# Patient Record
Sex: FEMALE | Race: WHITE | NOT HISPANIC OR LATINO | Employment: STUDENT | URBAN - METROPOLITAN AREA
[De-identification: names, ages, dates, MRNs, and addresses within clinical notes are randomized per-mention and may not be internally consistent; named-entity substitution may affect disease eponyms.]

---
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
## 2021-01-22 ENCOUNTER — TELEPHONE (OUTPATIENT)
Dept: INTERNAL MEDICINE | Facility: CLINIC | Age: 21
End: 2021-01-22

## 2021-02-03 ENCOUNTER — OFFICE VISIT (OUTPATIENT)
Dept: OBSTETRICS AND GYNECOLOGY | Facility: CLINIC | Age: 21
End: 2021-02-03
Payer: OTHER GOVERNMENT

## 2021-02-03 VITALS
DIASTOLIC BLOOD PRESSURE: 60 MMHG | WEIGHT: 157.88 LBS | SYSTOLIC BLOOD PRESSURE: 100 MMHG | BODY MASS INDEX: 23.38 KG/M2 | HEIGHT: 69 IN

## 2021-02-03 DIAGNOSIS — Z30.41 SURVEILLANCE OF PREVIOUSLY PRESCRIBED CONTRACEPTIVE PILL: ICD-10-CM

## 2021-02-03 DIAGNOSIS — Z01.419 ENCOUNTER FOR GYNECOLOGICAL EXAMINATION WITHOUT ABNORMAL FINDING: Primary | ICD-10-CM

## 2021-02-03 PROCEDURE — 99395 PR PREVENTIVE VISIT,EST,18-39: ICD-10-PCS | Mod: S$PBB,,, | Performed by: NURSE PRACTITIONER

## 2021-02-03 PROCEDURE — 99395 PREV VISIT EST AGE 18-39: CPT | Mod: S$PBB,,, | Performed by: NURSE PRACTITIONER

## 2021-02-03 PROCEDURE — 99999 PR PBB SHADOW E&M-EST. PATIENT-LVL III: CPT | Mod: PBBFAC,,, | Performed by: NURSE PRACTITIONER

## 2021-02-03 PROCEDURE — 99213 OFFICE O/P EST LOW 20 MIN: CPT | Mod: PBBFAC,PO | Performed by: NURSE PRACTITIONER

## 2021-02-03 PROCEDURE — 99999 PR PBB SHADOW E&M-EST. PATIENT-LVL III: ICD-10-PCS | Mod: PBBFAC,,, | Performed by: NURSE PRACTITIONER

## 2021-02-03 RX ORDER — NORETHINDRONE ACETATE AND ETHINYL ESTRADIOL 1.5-30(21)
1 KIT ORAL DAILY
Qty: 30 TABLET | Refills: 11 | Status: SHIPPED | OUTPATIENT
Start: 2021-02-03 | End: 2023-05-16

## 2021-04-28 ENCOUNTER — PATIENT MESSAGE (OUTPATIENT)
Dept: RESEARCH | Facility: HOSPITAL | Age: 21
End: 2021-04-28

## 2021-09-20 ENCOUNTER — OFFICE VISIT (OUTPATIENT)
Dept: INTERNAL MEDICINE | Facility: CLINIC | Age: 21
End: 2021-09-20
Payer: OTHER GOVERNMENT

## 2021-09-20 DIAGNOSIS — F41.1 GAD (GENERALIZED ANXIETY DISORDER): Primary | ICD-10-CM

## 2021-09-20 PROCEDURE — 99214 PR OFFICE/OUTPT VISIT, EST, LEVL IV, 30-39 MIN: ICD-10-PCS | Mod: 95,,, | Performed by: NURSE PRACTITIONER

## 2021-09-20 PROCEDURE — 99214 OFFICE O/P EST MOD 30 MIN: CPT | Mod: 95,,, | Performed by: NURSE PRACTITIONER

## 2021-09-20 RX ORDER — ESCITALOPRAM OXALATE 10 MG/1
10 TABLET ORAL DAILY
Qty: 30 TABLET | Refills: 1 | Status: SHIPPED | OUTPATIENT
Start: 2021-09-20 | End: 2021-11-16 | Stop reason: SDUPTHER

## 2021-09-21 ENCOUNTER — TELEPHONE (OUTPATIENT)
Dept: INTERNAL MEDICINE | Facility: CLINIC | Age: 21
End: 2021-09-21

## 2021-11-16 ENCOUNTER — TELEPHONE (OUTPATIENT)
Dept: INTERNAL MEDICINE | Facility: CLINIC | Age: 21
End: 2021-11-16
Payer: OTHER GOVERNMENT

## 2021-11-16 ENCOUNTER — OFFICE VISIT (OUTPATIENT)
Dept: INTERNAL MEDICINE | Facility: CLINIC | Age: 21
End: 2021-11-16
Payer: OTHER GOVERNMENT

## 2021-11-16 DIAGNOSIS — F41.1 GAD (GENERALIZED ANXIETY DISORDER): ICD-10-CM

## 2021-11-16 PROCEDURE — 99213 OFFICE O/P EST LOW 20 MIN: CPT | Mod: 95,,, | Performed by: NURSE PRACTITIONER

## 2021-11-16 PROCEDURE — 99213 PR OFFICE/OUTPT VISIT, EST, LEVL III, 20-29 MIN: ICD-10-PCS | Mod: 95,,, | Performed by: NURSE PRACTITIONER

## 2021-11-16 RX ORDER — ESCITALOPRAM OXALATE 10 MG/1
10 TABLET ORAL DAILY
Qty: 90 TABLET | Refills: 3 | Status: SHIPPED | OUTPATIENT
Start: 2021-11-16 | End: 2022-03-03 | Stop reason: SDUPTHER

## 2022-02-02 ENCOUNTER — TELEPHONE (OUTPATIENT)
Dept: INTERNAL MEDICINE | Facility: CLINIC | Age: 22
End: 2022-02-02
Payer: OTHER GOVERNMENT

## 2022-02-02 NOTE — TELEPHONE ENCOUNTER
----- Message from Filomena Leyva sent at 2/2/2022  2:41 PM CST -----  Type:  Patient Returning Call    Who Called:patient  Who Left Message for Patient:nurse  Does the patient know what this is regarding?:na  Would the patient rather a call back or a response via [facility]? Call back  Best Call Back [phone]  Additional Information: na

## 2022-02-15 ENCOUNTER — TELEPHONE (OUTPATIENT)
Dept: PSYCHIATRY | Facility: CLINIC | Age: 22
End: 2022-02-15
Payer: OTHER GOVERNMENT

## 2022-02-15 NOTE — TELEPHONE ENCOUNTER
Pt was scheduled for an appointment----- Message from Yvrose Reveles sent at 2/15/2022 12:07 PM CST -----  Regarding: referral  Contact: pt  Please call pt at [phone]. Pt has left several messages

## 2022-03-01 ENCOUNTER — PATIENT MESSAGE (OUTPATIENT)
Dept: PSYCHIATRY | Facility: CLINIC | Age: 22
End: 2022-03-01

## 2022-03-01 ENCOUNTER — OFFICE VISIT (OUTPATIENT)
Dept: PSYCHIATRY | Facility: CLINIC | Age: 22
End: 2022-03-01
Payer: OTHER GOVERNMENT

## 2022-03-01 DIAGNOSIS — F43.21 GRIEF ASSOCIATED WITH LOSS OF FETUS: Primary | ICD-10-CM

## 2022-03-01 PROCEDURE — 90791 PSYCH DIAGNOSTIC EVALUATION: CPT | Mod: 95,,, | Performed by: SOCIAL WORKER

## 2022-03-01 PROCEDURE — 90791 PR PSYCHIATRIC DIAGNOSTIC EVALUATION: ICD-10-PCS | Mod: 95,,, | Performed by: SOCIAL WORKER

## 2022-03-01 NOTE — PROGRESS NOTES
Psychiatry Initial Visit (PhD/LCSW)  Diagnostic Interview - CPT 14127    Date: 3/1/2022    Site: Telemed    Referral source: Self    Clinical status of patient: Outpatient    Raegan Osman, a 21 y.o. female, for initial evaluation visit.  Met with patient.    Chief complaint/reason for encounter: grief    History of present illness: Pt reports that she had mild post partum depression and anxiety following delivery of her first child about 2 years ago.  She has stayed on the medication that as initiated at that time and it has worked well in helping her manage her anxiety and depression.  A few weeks ago patient had a miscarriage at 7 weeks of pregnancy and she has been very sad and is grieving the loss of her baby.  Therapist validated her loss and assured her that her response is normal.  I also encouraged her to attend the infant loss support group that is hosted by [facility] over [employer].  That information was sent to her through the portal.      Pain: 0    Symptoms:   · Mood: denied  · Anxiety: denied  · Substance abuse: denied  · Cognitive functioning: denied  · Health behaviors: noncontributory    Psychiatric history: psychotropic management by PCP    Medical history: none    Family history of psychiatric illness: none    Social history (marriage, employment, etc.): Pt and her brother were raised by their parents until they .  Her Dad had visitation until patient was about 11.  She and brother stopped wanting to visit dad on his weekends because he was never there and step mom was not particularly welcoming.  Eventually her biological father surrendered his paternal rights. Pt recalls mom working several jobs and them getting food from the food pantry as a child.  Her mom has a lot of anger towards her biological father.  Mom remarried when patient was about 11 and step Dad has been good to patient and her brother.  Parents also have a child together who is 10 years old.  Patient is  engaged to her child's father.  They have been together on and off for 6 years but most recently have been together for 3 years.  Pt is going to school in [place] and wants to be an VLAD therapist for autistic children.  Her mom and step dad live in [place] and she lives in [place] with boyfriend.  His grandmother gave them a house to live in on her property which has helped them a great deal financially.  Pt recently reached out to biological father because she wanted to know why he was so willing to abandon them. Biological father told a very different story from mom and event sent documents to her suggesting that he had given mom the house, a car and half of his 401K.  Pt is not sure what the truth is because she remember how poor they were and mom tells a different story.  We talked about the possibility that perhaps that was what mom asked for but potentially he did not agree or did not do it.  Mom very defensive and angry that patient reached out to him.  Pt still having contact with him but is not sharing that information with her mom because she gets so upset about it.  Pt able to find neena in her son and trying to find some meaning in her loss.  Step dad is retired  and patient still on his health insurance.        Substance use:   Alcohol: none   Drugs: none   Tobacco: none   Caffeine: none    Current medications and drug reactions (include OTC, herbal): see medication list lexapro    Strengths and liabilities: Strength: Patient accepts guidance/feedback, Strength: Patient is expressive/articulate., Strength: Patient is intelligent., Strength: Patient is motivated for change., Strength: Patient is physically healthy., Strength: Patient has positive support network., Strength: Patient has reasonable judgment., Strength: Patient is stable., Liability: Patient lacks coping skills.    Current Evaluation:     Mental Status Exam:  General Appearance:  unremarkable, age appropriate   Speech: normal tone,  "normal rate, normal pitch, normal volume      Level of Cooperation: cooperative      Thought Processes: normal and logical   Mood: steady      Thought Content: normal, no suicidality, no homicidality, delusions, or paranoia   Affect: congruent and appropriate   Orientation: Oriented x3   Memory: recent >  intact   Attention Span & Concentration: spelled "WORLD" forwards and backwards   Fund of General Knowledge: intact and appropriate to age and level of education, 3 of 4 recent presidents   Abstract Reasoning: interpretation of proverbs was "don't know"   Judgment & Insight: good     Language  intact     Diagnostic Impression - Plan:       ICD-10-CM ICD-9-CM   1. Grief associated with loss of fetus  F43.21 309.0       Plan:individual psychotherapy    Return to Clinic: 2 weeks    Length of Service (minutes): 45    Aure Clarke   03/01/2022   1:07 PM   "

## 2022-03-03 DIAGNOSIS — F41.1 GAD (GENERALIZED ANXIETY DISORDER): ICD-10-CM

## 2022-03-03 RX ORDER — ESCITALOPRAM OXALATE 10 MG/1
10 TABLET ORAL DAILY
Qty: 30 TABLET | Refills: 0 | Status: SHIPPED | OUTPATIENT
Start: 2022-03-03 | End: 2023-05-16

## 2022-03-03 RX ORDER — ESCITALOPRAM OXALATE 10 MG/1
10 TABLET ORAL DAILY
Qty: 90 TABLET | Refills: 3 | OUTPATIENT
Start: 2022-03-03 | End: 2023-03-03

## 2022-03-03 NOTE — TELEPHONE ENCOUNTER
----- Message from Paola Acosta sent at 3/3/2022  1:06 PM CST -----  Contact: Raegan paulette [phone]  Requesting an RX refill or new RX.  Is this a refill or new RX: refill  RX name and strength:  EScitalopram oxalate (LEXAPRO) 10 MG tablet  Is this a 30 day or 90 day RX:   Pharmacy name and phone # :   [facility] - [place] [address]   Phone: [phone]  The doctors have asked that we provide their patients with the following 2 reminders -- prescription refills can take up to 72 hours, and a friendly reminder that in the future you can use your [facility] account to request refills:   Comments: [facility] no longer accepts pt's insurance so her rx's need to go to the pharmacy above

## 2022-03-08 ENCOUNTER — PATIENT MESSAGE (OUTPATIENT)
Dept: PSYCHIATRY | Facility: CLINIC | Age: 22
End: 2022-03-08

## 2022-05-02 ENCOUNTER — PATIENT MESSAGE (OUTPATIENT)
Dept: ADMINISTRATIVE | Facility: HOSPITAL | Age: 22
End: 2022-05-02
Payer: OTHER GOVERNMENT

## 2022-07-15 ENCOUNTER — PATIENT OUTREACH (OUTPATIENT)
Dept: ADMINISTRATIVE | Facility: HOSPITAL | Age: 22
End: 2022-07-15
Payer: OTHER GOVERNMENT

## 2022-07-15 NOTE — PROGRESS NOTES
Working Cervical Cancer Report; chart searched, lab [facility] & [facility]; no records found. I called pt to schedule overdue pap smear.LVM.

## 2022-08-04 ENCOUNTER — PATIENT MESSAGE (OUTPATIENT)
Dept: ADMINISTRATIVE | Facility: HOSPITAL | Age: 22
End: 2022-08-04
Payer: OTHER GOVERNMENT

## 2023-04-21 ENCOUNTER — TELEPHONE (OUTPATIENT)
Dept: OBSTETRICS AND GYNECOLOGY | Facility: CLINIC | Age: 23
End: 2023-04-21
Payer: MEDICAID

## 2023-04-21 NOTE — TELEPHONE ENCOUNTER
----- Message from Ksenia Evans sent at 4/21/2023  3:42 PM CDT -----  Contact: panfilo  .Type:  Patient Returning Call    Who Called: Raegan   Who Left Message for Patient: Nurse   Does the patient know what this is regarding?: Scheduling BC APPT  Would the patient rather a call back or a response via [facility]? CALL   Best Call Back Number: [phone]   Additional Information:

## 2023-04-21 NOTE — TELEPHONE ENCOUNTER
----- Message from Freya Stephens sent at 4/21/2023  1:41 PM CDT -----  Contact: Raegan Raygoza is calling to speak with the nurse regarding scheduling appointment for Birth Control (IUD). Please give a call back at .[phone] as requested.  Thanks  LR

## 2023-04-24 ENCOUNTER — TELEPHONE (OUTPATIENT)
Dept: OBSTETRICS AND GYNECOLOGY | Facility: CLINIC | Age: 23
End: 2023-04-24
Payer: MEDICAID

## 2023-04-24 NOTE — TELEPHONE ENCOUNTER
Left message for patient to return call to [phone].     Detail Level: Zone Patient Specific Otc Recommendations (Will Not Stick From Patient To Patient): DerMend twice a day

## 2023-04-24 NOTE — TELEPHONE ENCOUNTER
----- Message from Dara York sent at 4/24/2023  2:31 PM CDT -----  Contact: Raegan  Type:  Patient Returning Call    Who Called:Raegan  Who Left Message for Patient:unknown  Does the patient know what this is regarding?:Schedule appointment  Would the patient rather a call back or a response via [facility]? call  Best Call Back Number:[phone]  Additional Information: Patient reports missing a call and request another call back.  Thank you,  GH

## 2023-04-24 NOTE — TELEPHONE ENCOUNTER
----- Message from Tiffany Davalos sent at 4/21/2023  4:33 PM CDT -----  Contact: Raegan  .Type:  Patient Returning Call    Who Called:Raegan   Who Left Message for Patient: Mariaa   Does the patient know what this is regarding?:appt   Would the patient rather a call back or a response via [facility]? Call   Best Call Back Number:.[phone]  Additional Information: Pt returning a call

## 2023-05-16 ENCOUNTER — OFFICE VISIT (OUTPATIENT)
Dept: OBSTETRICS AND GYNECOLOGY | Facility: CLINIC | Age: 23
End: 2023-05-16
Payer: MEDICAID

## 2023-05-16 VITALS
HEIGHT: 69 IN | DIASTOLIC BLOOD PRESSURE: 70 MMHG | WEIGHT: 173.75 LBS | BODY MASS INDEX: 25.73 KG/M2 | SYSTOLIC BLOOD PRESSURE: 102 MMHG

## 2023-05-16 DIAGNOSIS — Z30.014 ENCOUNTER FOR INITIAL PRESCRIPTION OF INTRAUTERINE CONTRACEPTIVE DEVICE: Primary | ICD-10-CM

## 2023-05-16 PROCEDURE — 3008F BODY MASS INDEX DOCD: CPT | Mod: CPTII,,, | Performed by: NURSE PRACTITIONER

## 2023-05-16 PROCEDURE — 99212 OFFICE O/P EST SF 10 MIN: CPT | Mod: S$PBB,,, | Performed by: NURSE PRACTITIONER

## 2023-05-16 PROCEDURE — 3078F DIAST BP <80 MM HG: CPT | Mod: CPTII,,, | Performed by: NURSE PRACTITIONER

## 2023-05-16 PROCEDURE — 99999 PR PBB SHADOW E&M-EST. PATIENT-LVL III: ICD-10-PCS | Mod: PBBFAC,,, | Performed by: NURSE PRACTITIONER

## 2023-05-16 PROCEDURE — 1159F PR MEDICATION LIST DOCUMENTED IN MEDICAL RECORD: ICD-10-PCS | Mod: CPTII,,, | Performed by: NURSE PRACTITIONER

## 2023-05-16 PROCEDURE — 3078F PR MOST RECENT DIASTOLIC BLOOD PRESSURE < 80 MM HG: ICD-10-PCS | Mod: CPTII,,, | Performed by: NURSE PRACTITIONER

## 2023-05-16 PROCEDURE — 3074F PR MOST RECENT SYSTOLIC BLOOD PRESSURE < 130 MM HG: ICD-10-PCS | Mod: CPTII,,, | Performed by: NURSE PRACTITIONER

## 2023-05-16 PROCEDURE — 1160F PR REVIEW ALL MEDS BY PRESCRIBER/CLIN PHARMACIST DOCUMENTED: ICD-10-PCS | Mod: CPTII,,, | Performed by: NURSE PRACTITIONER

## 2023-05-16 PROCEDURE — 99999 PR PBB SHADOW E&M-EST. PATIENT-LVL III: CPT | Mod: PBBFAC,,, | Performed by: NURSE PRACTITIONER

## 2023-05-16 PROCEDURE — 3074F SYST BP LT 130 MM HG: CPT | Mod: CPTII,,, | Performed by: NURSE PRACTITIONER

## 2023-05-16 PROCEDURE — 99213 OFFICE O/P EST LOW 20 MIN: CPT | Mod: PBBFAC,PO | Performed by: NURSE PRACTITIONER

## 2023-05-16 PROCEDURE — 1160F RVW MEDS BY RX/DR IN RCRD: CPT | Mod: CPTII,,, | Performed by: NURSE PRACTITIONER

## 2023-05-16 PROCEDURE — 1159F MED LIST DOCD IN RCRD: CPT | Mod: CPTII,,, | Performed by: NURSE PRACTITIONER

## 2023-05-16 PROCEDURE — 99212 PR OFFICE/OUTPT VISIT, EST, LEVL II, 10-19 MIN: ICD-10-PCS | Mod: S$PBB,,, | Performed by: NURSE PRACTITIONER

## 2023-05-16 PROCEDURE — 3008F PR BODY MASS INDEX (BMI) DOCUMENTED: ICD-10-PCS | Mod: CPTII,,, | Performed by: NURSE PRACTITIONER

## 2023-05-16 RX ORDER — ASCORBIC ACID, CHOLECALCIFEROL, DL-.ALPHA.-TOCOPHEROL ACETATE, THIAMINE HYDROCHLORIDE, RIBOFLAVIN, NIACINAMIDE, PYRIDOXINE HYDROCHLORIDE, FOLIC ACID, CYANOCOBALAMIN, CALCIUM CARBONATE, FERROUS FUMARATE, ZINC OXIDE, CUPRIC SULFATE 100; 400; 30; 1.6; 1.6; 20; 3.1; 1; 12; 200; 27; 10; 2 MG/1; [IU]/1; [IU]/1; MG/1; MG/1; MG/1; MG/1; MG/1; UG/1; MG/1; MG/1; MG/1; MG/1
1 TABLET, COATED ORAL
COMMUNITY
Start: 2023-05-08 | End: 2023-06-21 | Stop reason: SDUPTHER

## 2023-05-16 RX ORDER — ACETAMINOPHEN AND CODEINE PHOSPHATE 120; 12 MG/5ML; MG/5ML
1 SOLUTION ORAL
COMMUNITY
Start: 2023-04-20 | End: 2023-05-19 | Stop reason: ALTCHOICE

## 2023-05-16 NOTE — PROGRESS NOTES
Subjective:       Patient ID: Raegan Osman is a 23 y.o. female.    Chief Complaint:  Well Woman      History of Present Illness  HPI   present for birth control  Recently moved back here from [place]  Started minipill  r/t breastfeeding 4 month old  Would like to discuss other options  Interested in LARC  Currently menstruating; declines annual exam today    [facility]    GYN & OB History  Patient's last menstrual period was 2023.   Date of Last Pap: No result found    OB History    Para Term  AB Living   3 2 2 0 1 2   SAB IAB Ectopic Multiple Live Births   1 0 0   2      # Outcome Date GA Lbr Colin/2nd Weight Sex Delivery Anes PTL Lv   3 Term 23 39w0d   M Vag-Spont   JUNAID   2 SAB 2022           1 Term 02/10/20 40w1d / 01:06 3.66 kg (8 lb 1.1 oz) M Vag-Spont EPI N JUNAID      Complications: Fever of unknown origin (FUO), Hemorrhage after delivery of fetus       Review of Systems  Review of Systems   Genitourinary:  Negative for menstrual problem.         Objective:      Physical Exam:   Constitutional: She is oriented to person, place, and time. She appears well-developed and well-nourished. No distress.    HENT:   Head: Normocephalic and atraumatic.    Eyes: Pupils are equal, round, and reactive to light. Conjunctivae and EOM are normal.      Pulmonary/Chest: Effort normal.                  Musculoskeletal: Normal range of motion and moves all extremeties.       Neurological: She is alert and oriented to person, place, and time.    Skin: Skin is warm and dry. No rash noted. She is not diaphoretic. No erythema. No pallor.    Psychiatric: She has a normal mood and affect. Her behavior is normal. Judgment and thought content normal.           Assessment:        1. Encounter for initial prescription of intrauterine contraceptive device    2. Encounter for gynecological examination without abnormal finding               Plan:   Risks and benefits of LARCS discussed  Pt chose  radha  Will insert Friday  Safe sex practices discussed.  Instructed to take NSAID with food when on the way to visit.    Continue annual well woman exam.    Encounter for initial prescription of intrauterine contraceptive device  -     Device Authorization Order    Encounter for gynecological examination without abnormal finding  -     Cancel: Liquid-Based Pap Smear, Screening

## 2023-05-19 ENCOUNTER — PROCEDURE VISIT (OUTPATIENT)
Dept: OBSTETRICS AND GYNECOLOGY | Facility: CLINIC | Age: 23
End: 2023-05-19
Payer: MEDICAID

## 2023-05-19 VITALS
DIASTOLIC BLOOD PRESSURE: 82 MMHG | BODY MASS INDEX: 25.86 KG/M2 | WEIGHT: 174.63 LBS | SYSTOLIC BLOOD PRESSURE: 110 MMHG | HEIGHT: 69 IN

## 2023-05-19 DIAGNOSIS — Z30.430 ENCOUNTER FOR IUD INSERTION: Primary | ICD-10-CM

## 2023-05-19 LAB
B-HCG UR QL: NEGATIVE
CTP QC/QA: YES

## 2023-05-19 PROCEDURE — 58300 INSERT INTRAUTERINE DEVICE: CPT | Mod: PBBFAC,PO | Performed by: NURSE PRACTITIONER

## 2023-05-19 PROCEDURE — 81025 URINE PREGNANCY TEST: CPT | Mod: PBBFAC,PO | Performed by: NURSE PRACTITIONER

## 2023-05-19 PROCEDURE — 58300 INSERTION OF IUD: ICD-10-PCS | Mod: S$PBB,,, | Performed by: NURSE PRACTITIONER

## 2023-05-19 PROCEDURE — 58300 INSERT INTRAUTERINE DEVICE: CPT | Mod: S$PBB,,, | Performed by: NURSE PRACTITIONER

## 2023-05-19 RX ADMIN — LEVONORGESTREL 17.5 MCG: 19.5 INTRAUTERINE DEVICE INTRAUTERINE at 02:05

## 2023-05-19 NOTE — PROCEDURES
Insertion of IUD    Date/Time: 5/19/2023 2:00 PM  Performed by: Hillary Yeboah NP  Authorized by: Hillary Yeboah NP     Consent:     Consent obtained:  Written    Consent given by:  Patient    Procedure risks and benefits discussed: yes      Patient questions answered: yes      Patient agrees, verbalizes understanding, and wants to proceed: yes      Educational handouts given: yes      Instructions and paperwork completed: yes    Procedure:     Pelvic exam performed: yes      Negative GC/chlamydia test: no      Negative urine pregnancy test: yes      Negative serum pregnancy test: no      Cervix cleaned and prepped: yes      Speculum placed in vagina: yes      Tenaculum applied to cervix: no      Uterus sounded: yes      Uterus sound depth (cm):  6.5    IUD inserted with no complications: yes      Strings trimmed: yes (1 in)    17.5 mcg levonorgestreL 17.5 mcg/24 hrs (5 yrs) 19.5 mg     Post-procedure:     Patient tolerated procedure well: yes      Patient will follow up after next period: yes

## 2023-06-02 ENCOUNTER — TELEPHONE (OUTPATIENT)
Dept: OBSTETRICS AND GYNECOLOGY | Facility: CLINIC | Age: 23
End: 2023-06-02
Payer: MEDICAID

## 2023-06-02 NOTE — TELEPHONE ENCOUNTER
Attempted to contact pt to inform that appt 6/16 with LW needed to be r/s due to a provider schedule change. Pt did not answer. LVM to rtc. Appt cancelled. Message to pt

## 2023-06-02 NOTE — TELEPHONE ENCOUNTER
Patient r/s appt on 6/16 with LW. Pt stated that she was not able to get pap smear records so she may need a pap. Pt states that she has been bleeding on and off since IUD insertion. Pt verbalized understanding and agreed to date, time, and location of appt.

## 2023-06-20 ENCOUNTER — OFFICE VISIT (OUTPATIENT)
Dept: OBSTETRICS AND GYNECOLOGY | Facility: CLINIC | Age: 23
End: 2023-06-20
Payer: MEDICAID

## 2023-06-20 VITALS
HEIGHT: 69 IN | WEIGHT: 166.88 LBS | SYSTOLIC BLOOD PRESSURE: 114 MMHG | BODY MASS INDEX: 24.72 KG/M2 | DIASTOLIC BLOOD PRESSURE: 54 MMHG

## 2023-06-20 DIAGNOSIS — Z30.431 ENCOUNTER FOR ROUTINE CHECKING OF INTRAUTERINE CONTRACEPTIVE DEVICE (IUD): Primary | ICD-10-CM

## 2023-06-20 PROCEDURE — 99999 PR PBB SHADOW E&M-EST. PATIENT-LVL III: ICD-10-PCS | Mod: PBBFAC,,, | Performed by: NURSE PRACTITIONER

## 2023-06-20 PROCEDURE — 3074F PR MOST RECENT SYSTOLIC BLOOD PRESSURE < 130 MM HG: ICD-10-PCS | Mod: CPTII,,, | Performed by: NURSE PRACTITIONER

## 2023-06-20 PROCEDURE — 99213 OFFICE O/P EST LOW 20 MIN: CPT | Mod: PBBFAC,PO | Performed by: NURSE PRACTITIONER

## 2023-06-20 PROCEDURE — 1160F RVW MEDS BY RX/DR IN RCRD: CPT | Mod: CPTII,,, | Performed by: NURSE PRACTITIONER

## 2023-06-20 PROCEDURE — 1159F MED LIST DOCD IN RCRD: CPT | Mod: CPTII,,, | Performed by: NURSE PRACTITIONER

## 2023-06-20 PROCEDURE — 1159F PR MEDICATION LIST DOCUMENTED IN MEDICAL RECORD: ICD-10-PCS | Mod: CPTII,,, | Performed by: NURSE PRACTITIONER

## 2023-06-20 PROCEDURE — 3074F SYST BP LT 130 MM HG: CPT | Mod: CPTII,,, | Performed by: NURSE PRACTITIONER

## 2023-06-20 PROCEDURE — 1160F PR REVIEW ALL MEDS BY PRESCRIBER/CLIN PHARMACIST DOCUMENTED: ICD-10-PCS | Mod: CPTII,,, | Performed by: NURSE PRACTITIONER

## 2023-06-20 PROCEDURE — 3078F PR MOST RECENT DIASTOLIC BLOOD PRESSURE < 80 MM HG: ICD-10-PCS | Mod: CPTII,,, | Performed by: NURSE PRACTITIONER

## 2023-06-20 PROCEDURE — 99213 OFFICE O/P EST LOW 20 MIN: CPT | Mod: S$PBB,,, | Performed by: NURSE PRACTITIONER

## 2023-06-20 PROCEDURE — 3078F DIAST BP <80 MM HG: CPT | Mod: CPTII,,, | Performed by: NURSE PRACTITIONER

## 2023-06-20 PROCEDURE — 3008F PR BODY MASS INDEX (BMI) DOCUMENTED: ICD-10-PCS | Mod: CPTII,,, | Performed by: NURSE PRACTITIONER

## 2023-06-20 PROCEDURE — 99213 PR OFFICE/OUTPT VISIT, EST, LEVL III, 20-29 MIN: ICD-10-PCS | Mod: S$PBB,,, | Performed by: NURSE PRACTITIONER

## 2023-06-20 PROCEDURE — 3008F BODY MASS INDEX DOCD: CPT | Mod: CPTII,,, | Performed by: NURSE PRACTITIONER

## 2023-06-20 PROCEDURE — 99999 PR PBB SHADOW E&M-EST. PATIENT-LVL III: CPT | Mod: PBBFAC,,, | Performed by: NURSE PRACTITIONER

## 2023-06-20 NOTE — PROGRESS NOTES
Subjective:       Patient ID: Raegan Osman is a 23 y.o. female.    Chief Complaint:  IUD Check      History of Present Illness  HPI   present for kyleena string check  Has been bleeding since insertion  Has not checked for strings  Intermittent cramping; tolerable; has not taken anything  Flow varies; steady today; never light  No flooding  Pads-size 2 changing q2-3h for cleanliness  No issues with intercourse    Needs refill on PNV    GYN & OB History  No LMP recorded. (Menstrual status: Birth Control).   Date of Last Pap: No result found    OB History    Para Term  AB Living   3 2 2 0 1 2   SAB IAB Ectopic Multiple Live Births   1 0 0   2      # Outcome Date GA Lbr Colin/2nd Weight Sex Delivery Anes PTL Lv   3 Term 23 39w0d   M Vag-Spont   JUNAID   2 SAB 2022           1 Term 02/10/20 40w1d / 01:06 3.66 kg (8 lb 1.1 oz) M Vag-Spont EPI N JUNAID      Complications: Fever of unknown origin (FUO), Hemorrhage after delivery of fetus       Review of Systems  Review of Systems   Genitourinary:  Positive for menstrual problem. Negative for dysmenorrhea, dyspareunia, menorrhagia, pelvic pain, vaginal discharge and vaginal odor.         Objective:      Physical Exam:   Constitutional: She is oriented to person, place, and time. She appears well-developed and well-nourished. No distress.    HENT:   Head: Normocephalic and atraumatic.    Eyes: Pupils are equal, round, and reactive to light. Conjunctivae and EOM are normal.     Cardiovascular:  Normal rate.             Pulmonary/Chest: Effort normal.        Abdominal: Soft. She exhibits no distension. There is no abdominal tenderness. There is no rebound and no guarding. Hernia confirmed negative in the right inguinal area and confirmed negative in the left inguinal area.     Genitourinary:    Inguinal canal, uterus, right adnexa and left adnexa normal.   Rectum:      No external hemorrhoid.      Pelvic exam was performed with patient supine.    The external female genitalia was normal.   No external genitalia lesions identified,Genitalia hair distrobution normal .   Labial bartholins normal.There is no rash, tenderness, lesion or injury on the right labia. There is no rash, tenderness, lesion or injury on the left labia. Cervix is normal. Right adnexum displays no mass, no tenderness and no fullness. Left adnexum displays no mass, no tenderness and no fullness. There is bleeding (small amt) and rectocele in the vagina. No erythema,  no vaginal discharge or tenderness in the vagina.    No foreign body in the vagina.      No signs of injury in the vagina.   Cervix exhibits no motion tenderness, no lesion, no friability, no lesion, no tenderness and no polyp. Uerus contour normal  Uterus is not enlarged and not tender. Uterus size: 8 cm.Normal urethral meatus.Urethral Meatus exhibits: urethral lesion and prolapsedUrethra findings: no urethral mass, no tenderness and no urethral scarringBladder findings: no bladder distention and no bladder tendernessIUD strings visualized.          Musculoskeletal: Normal range of motion and moves all extremeties.      Lymphadenopathy: No inguinal adenopathy noted on the right or left side.    Neurological: She is alert and oriented to person, place, and time.    Skin: Skin is warm and dry. No rash noted. She is not diaphoretic. No erythema. No pallor.    Psychiatric: She has a normal mood and affect. Her behavior is normal. Judgment and thought content normal.           Assessment:        1. Encounter for routine checking of intrauterine contraceptive device (IUD)    2. Lactating mother               Plan:   Continue strings checks after menses ends  NSAIDs as needed for pelvic pain; if not resolved RTC  May still experience irregular bleeding for 3-6 months after insertion    U/s ordered  Refill sent on PNV    Continue annual well woman exam.    Encounter for routine checking of intrauterine contraceptive device (IUD)  -      US Pelvis Comp with Transvag NON-OB (xpd; Future; Expected date: 06/20/2023    Lactating mother  -      27 mg iron- 1 mg Tab; Take 1 tablet by mouth once daily.  Dispense: 30 tablet; Refill: 12

## 2023-06-21 ENCOUNTER — PATIENT MESSAGE (OUTPATIENT)
Dept: OBSTETRICS AND GYNECOLOGY | Facility: CLINIC | Age: 23
End: 2023-06-21
Payer: MEDICAID

## 2023-06-21 DIAGNOSIS — N81.6 RECTOCELE: Primary | ICD-10-CM

## 2023-06-21 RX ORDER — ASCORBIC ACID, CHOLECALCIFEROL, DL-.ALPHA.-TOCOPHEROL ACETATE, THIAMINE HYDROCHLORIDE, RIBOFLAVIN, NIACINAMIDE, PYRIDOXINE HYDROCHLORIDE, FOLIC ACID, CYANOCOBALAMIN, CALCIUM CARBONATE, FERROUS FUMARATE, ZINC OXIDE, CUPRIC SULFATE 100; 400; 30; 1.6; 1.6; 20; 3.1; 1; 12; 200; 27; 10; 2 MG/1; [IU]/1; [IU]/1; MG/1; MG/1; MG/1; MG/1; MG/1; UG/1; MG/1; MG/1; MG/1; MG/1
1 TABLET, COATED ORAL DAILY
Qty: 30 TABLET | Refills: 12 | Status: SHIPPED | OUTPATIENT
Start: 2023-06-21

## 2023-06-23 ENCOUNTER — PATIENT MESSAGE (OUTPATIENT)
Dept: OBSTETRICS AND GYNECOLOGY | Facility: CLINIC | Age: 23
End: 2023-06-23
Payer: MEDICAID

## 2023-06-29 ENCOUNTER — HOSPITAL ENCOUNTER (OUTPATIENT)
Dept: RADIOLOGY | Facility: HOSPITAL | Age: 23
Discharge: HOME | End: 2023-06-29
Attending: NURSE PRACTITIONER
Payer: MEDICAID

## 2023-06-29 ENCOUNTER — PATIENT MESSAGE (OUTPATIENT)
Dept: OBSTETRICS AND GYNECOLOGY | Facility: CLINIC | Age: 23
End: 2023-06-29
Payer: MEDICAID

## 2023-06-29 DIAGNOSIS — Z30.431 ENCOUNTER FOR ROUTINE CHECKING OF INTRAUTERINE CONTRACEPTIVE DEVICE (IUD): ICD-10-CM

## 2023-06-29 PROCEDURE — 76856 US PELVIS COMP WITH TRANSVAG NON-OB (XPD): ICD-10-PCS | Mod: 26,,, | Performed by: RADIOLOGY

## 2023-06-29 PROCEDURE — 76856 US EXAM PELVIC COMPLETE: CPT | Mod: TC,PO

## 2023-06-29 PROCEDURE — 76830 US PELVIS COMP WITH TRANSVAG NON-OB (XPD): ICD-10-PCS | Mod: 26,,, | Performed by: RADIOLOGY

## 2023-06-29 PROCEDURE — 76830 TRANSVAGINAL US NON-OB: CPT | Mod: 26,,, | Performed by: RADIOLOGY

## 2023-06-29 PROCEDURE — 76856 US EXAM PELVIC COMPLETE: CPT | Mod: 26,,, | Performed by: RADIOLOGY

## 2023-10-23 ENCOUNTER — TELEPHONE (OUTPATIENT)
Dept: OBSTETRICS AND GYNECOLOGY | Facility: CLINIC | Age: 23
End: 2023-10-23
Payer: MEDICAID

## 2023-10-23 NOTE — TELEPHONE ENCOUNTER
----- Message from Diamone Speed sent at 10/23/2023  1:06 PM CDT -----  Regarding: self  [phone]  Type: Patient Call Back       Who called: self        What is the request in detail: pt have some concerning she doesn't know if she have a uti yeast infection and would dari to be seen as soon as possible         Can the clinic reply by [facility]? Yes       Would the patient rather a call back or a response via [facility]? My chart       Best call back number: [phone]

## 2023-10-27 ENCOUNTER — OFFICE VISIT (OUTPATIENT)
Dept: OBSTETRICS AND GYNECOLOGY | Facility: CLINIC | Age: 23
End: 2023-10-27
Payer: MEDICAID

## 2023-10-27 VITALS
SYSTOLIC BLOOD PRESSURE: 100 MMHG | DIASTOLIC BLOOD PRESSURE: 64 MMHG | HEIGHT: 69 IN | WEIGHT: 151.88 LBS | BODY MASS INDEX: 22.49 KG/M2

## 2023-10-27 DIAGNOSIS — R35.0 URINARY FREQUENCY: Primary | ICD-10-CM

## 2023-10-27 DIAGNOSIS — Z97.5 IUD (INTRAUTERINE DEVICE) IN PLACE: ICD-10-CM

## 2023-10-27 DIAGNOSIS — N76.0 ACUTE VAGINITIS: ICD-10-CM

## 2023-10-27 LAB
BILIRUBIN, UA POC OHS: NEGATIVE
BLOOD, UA POC OHS: NEGATIVE
CLARITY, UA POC OHS: CLEAR
COLOR, UA POC OHS: YELLOW
GLUCOSE, UA POC OHS: NEGATIVE
KETONES, UA POC OHS: NEGATIVE
LEUKOCYTES, UA POC OHS: NEGATIVE
NITRITE, UA POC OHS: NEGATIVE
PH, UA POC OHS: 6
PROTEIN, UA POC OHS: NEGATIVE
SPECIFIC GRAVITY, UA POC OHS: >=1.03
UROBILINOGEN, UA POC OHS: 0.2

## 2023-10-27 PROCEDURE — 99213 OFFICE O/P EST LOW 20 MIN: CPT | Mod: S$PBB,,, | Performed by: NURSE PRACTITIONER

## 2023-10-27 PROCEDURE — 3078F PR MOST RECENT DIASTOLIC BLOOD PRESSURE < 80 MM HG: ICD-10-PCS | Mod: CPTII,,, | Performed by: NURSE PRACTITIONER

## 2023-10-27 PROCEDURE — 99999PBSHW PR PBB SHADOW TECHNICAL ONLY FILED TO HB: Mod: PBBFAC,,,

## 2023-10-27 PROCEDURE — 99999PBSHW POCT URINALYSIS(INSTRUMENT): Mod: PBBFAC,,,

## 2023-10-27 PROCEDURE — 81003 URINALYSIS AUTO W/O SCOPE: CPT | Mod: PBBFAC,PO | Performed by: NURSE PRACTITIONER

## 2023-10-27 PROCEDURE — 1160F RVW MEDS BY RX/DR IN RCRD: CPT | Mod: CPTII,,, | Performed by: NURSE PRACTITIONER

## 2023-10-27 PROCEDURE — 81514 NFCT DS BV&VAGINITIS DNA ALG: CPT | Performed by: NURSE PRACTITIONER

## 2023-10-27 PROCEDURE — 1160F PR REVIEW ALL MEDS BY PRESCRIBER/CLIN PHARMACIST DOCUMENTED: ICD-10-PCS | Mod: CPTII,,, | Performed by: NURSE PRACTITIONER

## 2023-10-27 PROCEDURE — 3008F BODY MASS INDEX DOCD: CPT | Mod: CPTII,,, | Performed by: NURSE PRACTITIONER

## 2023-10-27 PROCEDURE — 1159F PR MEDICATION LIST DOCUMENTED IN MEDICAL RECORD: ICD-10-PCS | Mod: CPTII,,, | Performed by: NURSE PRACTITIONER

## 2023-10-27 PROCEDURE — 99999 PR PBB SHADOW E&M-EST. PATIENT-LVL III: CPT | Mod: PBBFAC,,, | Performed by: NURSE PRACTITIONER

## 2023-10-27 PROCEDURE — 99213 PR OFFICE/OUTPT VISIT, EST, LEVL III, 20-29 MIN: ICD-10-PCS | Mod: S$PBB,,, | Performed by: NURSE PRACTITIONER

## 2023-10-27 PROCEDURE — 1159F MED LIST DOCD IN RCRD: CPT | Mod: CPTII,,, | Performed by: NURSE PRACTITIONER

## 2023-10-27 PROCEDURE — 99213 OFFICE O/P EST LOW 20 MIN: CPT | Mod: PBBFAC,PO | Performed by: NURSE PRACTITIONER

## 2023-10-27 PROCEDURE — 3078F DIAST BP <80 MM HG: CPT | Mod: CPTII,,, | Performed by: NURSE PRACTITIONER

## 2023-10-27 PROCEDURE — 3074F SYST BP LT 130 MM HG: CPT | Mod: CPTII,,, | Performed by: NURSE PRACTITIONER

## 2023-10-27 PROCEDURE — 3074F PR MOST RECENT SYSTOLIC BLOOD PRESSURE < 130 MM HG: ICD-10-PCS | Mod: CPTII,,, | Performed by: NURSE PRACTITIONER

## 2023-10-27 PROCEDURE — 3008F PR BODY MASS INDEX (BMI) DOCUMENTED: ICD-10-PCS | Mod: CPTII,,, | Performed by: NURSE PRACTITIONER

## 2023-10-27 PROCEDURE — 99999 PR PBB SHADOW E&M-EST. PATIENT-LVL III: ICD-10-PCS | Mod: PBBFAC,,, | Performed by: NURSE PRACTITIONER

## 2023-10-27 PROCEDURE — 99999PBSHW POCT URINALYSIS(INSTRUMENT): ICD-10-PCS | Mod: PBBFAC,,,

## 2023-10-27 NOTE — PROGRESS NOTES
Subjective:       Patient ID: Raegan Osman is a 23 y.o. female.    Chief Complaint:  Urinary Tract Infection and Vaginitis      History of Present Illness  HPI   present for vaginal irritation and burning when she wipes since Monday  No discharge  Did experience spotting for the first time since insertion with cramping recently  Urinary frequency; drinking more water  Breastfeeding  Showers with dove, but switched to dial x2 weeks  Has not been able to scheduled with PFT    GYN & OB History  No LMP recorded. (Menstrual status: Birth Control).   Date of Last Pap: No result found    OB History    Para Term  AB Living   3 2 2 0 1 2   SAB IAB Ectopic Multiple Live Births   1 0 0   2      # Outcome Date GA Lbr Colin/2nd Weight Sex Delivery Anes PTL Lv   3 Term 23 39w0d   M Vag-Spont   JUNAID   2 SAB 2022           1 Term 02/10/20 40w1d / 01:06 3.66 kg (8 lb 1.1 oz) M Vag-Spont EPI N JUNAID      Complications: Fever of unknown origin (FUO), Hemorrhage after delivery of fetus       Review of Systems  Review of Systems   Genitourinary:  Positive for frequency, pelvic pain and vaginal pain (burning). Negative for dysuria, menorrhagia, menstrual problem, urgency, vaginal discharge and vaginal odor.           Objective:      Physical Exam:   Constitutional: She is oriented to person, place, and time. She appears well-developed and well-nourished. No distress.    HENT:   Head: Normocephalic and atraumatic.    Eyes: Pupils are equal, round, and reactive to light. Conjunctivae and EOM are normal.     Cardiovascular:  Normal rate.             Pulmonary/Chest: Effort normal.        Abdominal: Soft. She exhibits no distension. There is no abdominal tenderness. There is no rebound and no guarding. Hernia confirmed negative in the right inguinal area and confirmed negative in the left inguinal area.     Genitourinary:    Inguinal canal, uterus, right adnexa and left adnexa normal.   Rectum:      No  external hemorrhoid.      Pelvic exam was performed with patient in the lithotomy position.   The external female genitalia was normal.   No external genitalia lesions identified,Genitalia hair distrobution normal .   Labial bartholins normal.There is no rash, tenderness, lesion or injury on the right labia. There is no rash, tenderness, lesion or injury on the left labia. Cervix is normal. Right adnexum displays no mass, no tenderness and no fullness. Left adnexum displays no mass, no tenderness and no fullness. There is rectocele in the vagina. No erythema,  no vaginal discharge, tenderness or bleeding in the vagina.    No foreign body in the vagina.      No signs of injury in the vagina.   Cervix exhibits no motion tenderness, no lesion, no friability, no lesion, no tenderness and no polyp. Uerus contour normal  Uterus is not enlarged and not tender. Uterus size: 8 cm.Normal urethral meatus.Urethral Meatus exhibits: urethral lesion and prolapsedUrethra findings: no urethral mass, no tenderness and no urethral scarringBladder findings: no bladder distention and no bladder tendernessIUD strings visualized.          Musculoskeletal: Normal range of motion and moves all extremeties.      Lymphadenopathy: No inguinal adenopathy noted on the right or left side.    Neurological: She is alert and oriented to person, place, and time.    Skin: Skin is warm and dry. No rash noted. She is not diaphoretic. No erythema. No pallor.    Psychiatric: She has a normal mood and affect. Her behavior is normal. Judgment and thought content normal.             Assessment:        1. Urinary frequency    2. Acute vaginitis    3. IUD (intrauterine device) in place               Plan:   U/a normal  Affirm collected.    Continue menstrual calendar  Will reach out to PT    Continue annual well woman exam.    Urinary frequency  -     POCT Urinalysis(Instrument)    Acute vaginitis  -     Vaginosis Screen by DNA Probe    IUD (intrauterine  device) in place

## 2023-10-28 LAB
BACTERIAL VAGINOSIS DNA: NEGATIVE
CANDIDA GLABRATA DNA: NEGATIVE
CANDIDA KRUSEI DNA: NEGATIVE
CANDIDA RRNA VAG QL PROBE: NEGATIVE
T VAGINALIS RRNA GENITAL QL PROBE: NEGATIVE

## 2023-11-04 ENCOUNTER — PATIENT MESSAGE (OUTPATIENT)
Dept: OBSTETRICS AND GYNECOLOGY | Facility: CLINIC | Age: 23
End: 2023-11-04
Payer: MEDICAID

## 2023-11-15 ENCOUNTER — CLINICAL SUPPORT (OUTPATIENT)
Dept: REHABILITATION | Facility: HOSPITAL | Age: 23
End: 2023-11-15
Payer: MEDICAID

## 2023-11-15 DIAGNOSIS — N81.89 PELVIC FLOOR WEAKNESS IN FEMALE: ICD-10-CM

## 2023-11-15 DIAGNOSIS — N81.9 FEMALE GENITAL PROLAPSE, UNSPECIFIED TYPE: Primary | ICD-10-CM

## 2023-11-15 PROCEDURE — 97161 PT EVAL LOW COMPLEX 20 MIN: CPT | Mod: PN

## 2023-11-15 PROCEDURE — 97530 THERAPEUTIC ACTIVITIES: CPT | Mod: PN

## 2023-11-15 NOTE — PATIENT INSTRUCTIONS
"Bowel Movement Body Mechanics     1. Sit on the toilet comfortably with legs and buttocks relaxed.  2. Put your feet on a step stool or squatty potty (7-9 inches tall). This helps the poop come out easier.  3. Use good "potty posture": Lean forward while keeping your back straight and rest your elbows or forearms on your knees, keeping the knees apart.  4. The pelvic floor has to fully relax for the poop to come out. Let your whole body relax, even letting the belly hang. Try to think about fully relaxing or "dropping" the pelvic floor muscles. You may want to do a few diaphragmatic breaths to help with this.  5. Exhale like you are blowing out birthday candles while you gently bear down. Do not strain or hold your breath!                Kegels- Supported Posterior Pelvic Tilt    Begin lying on your back with your knees bent and feet flat on the floor, and a firm pillow under your pelvis to help maintain an posteriorly rotated pelvis as shown in the picture. This will help flatten the arch in your back so that your back remains flat on the floor.       Once youre in position, begin with taking deep belly breaths while you bring awareness to your pelvic floor.  Consciously try to get a feel for what it feels like to contract and relax your pelvic floor muscles so you can feel the difference between shortening (pulling in) and lengthening (letting go).     Now picture your pelvic floor as an elevator. When you INHALE deeply contract the pelvic floor muscle and make the elevator go up a couple of floors.  Hold the elevator up for 5 seconds.   Now EXHALE all the air out through your mouth while you relax the pelvic floor muscles SLOWLY letting the "elevator go back down to the ground level" slowly letting that tension go for about 5 seconds.    Once your pelvic floor muscles are relaxed and the elevator is back at ground level rest for one whole breathing cycle of inhalation/exhalation about 10 seconds.      Repeat  " this a total of 10 repetitions of raising and lowering the elevator with 10 second rest in between each repetition.

## 2023-11-15 NOTE — PLAN OF CARE
"[facility]   Physical Therapy Initial Evaluation        Date: 11/15/2023   Name: Raegan Osman  Clinic Number: [id number]    Therapy Diagnosis:   Encounter Diagnoses   Name Primary?    Female genital prolapse, unspecified type Yes    Pelvic floor weakness in female      Physician: Hillary Yeboah, NP    Physician Orders: PT Eval and Treat   Medical Diagnosis from Referral: N81.6 (ICD-10-CM) - Rectocele   Evaluation Date: 11/15/2023  Authorization Period Expiration: 2024  Plan of Care Expiration: 2/15/2024  Progress Note Due: 12/15/2023  Visit # / Visits authorized:    FOTO: 1/3    Precautions: Standard     Time In: 11:07 AM   Time Out: 11:32 AM   Total Appointment Time (timed & untimed codes): 25 minutes    SUBJECTIVE       Date of onset: patient reports her OB said her pelvis is lower than it should be. She denies any pain or incontinence.   History of current condition - Raegan reports: vaginal air coming out ("vaginal fart"). She reports occasional pain with intercourse, but her fiance will sometimes be gone for a month at time.      OB/GYN History:  and vaginal delivery  Using vaginal estrogen cream: No  Sexually active? Yes  Pelvic Pain with: with intercourse occasionally;  works away for a month   Pelvic pressure? No    Bladder/Bowel History: pain with bowel movements in the past   Frequency of urination:   Daytime: can wait 2-4 hours            Nighttime: 0  Difficulty initiating urine stream: No  Urine stream: strong  Complete emptying: Yes  Bladder leakage: No  Urinary Urgency: No.      Frequency of bowel movements: every few days; always been that or every other day   Difficulty initiating BM: No  Quality/Shape of BM: Fergus Stool Chart type 3-4  Does Patient Feel Empty after BM? Yes  Bowel Urgency: No.    Fiber Supplements or Laxative Use? No   Pain with BM: Not currently    Bleeding with BM: Yes used to with straining    Colon leakage: " "No    Types of fluid intake: Water:  ozs/day and Coffee: 0-1/day  Diet: Standard  Habitus: well developed, well nourished  Abuse/Neglect: Pt denies a history of physical or emotional abuse at this visit.       Pain:  Location: none currently; pain with intercourse   Current 0/10, worst 5/10, best 0/10   Pelvic Pain Duration Occurs only during provocation  Location of Pelvic Pain: Deep pelvic floor muscles   Description: Sharp  Aggravating Factors/Activities that cause symptoms: Vaginal exam/provocation   Easing Factors: termination of vaginal exam/intercourse      Medical History: Raegan  has a past medical history of Heart block and Postpartum depression.     Surgical History: Raegan Osman  has a past surgical history that includes Umbilical hernia repair.    Medications: Raegan has a current medication list which includes the following prescription(s): , and the following Facility-Administered Medications: levonorgestrel.    Allergies: Review of patient's allergies indicates:  No Known Allergies       Prior Therapy/Previous treatment included: no   Social History/Living Arrangements: lives with their family  Occupation:    Prior Level of Function: Pt was independent with all ADLs and iADLs without pain, no reports of incontinence of bowel or bladder.  Current Level of Function: Pt is still independent with all ADLs and iADLs without pain, no reports of incontinence of bowel or bladder.    Pts goals: "get rid of vaginal farts" and "get everything to got back to where it should be"    OBJECTIVE     See EMR under MEDIA for written consent provided 11/15/2023  Chaperone: declined    ORTHO SCREEN  Posture in sitting: slouched   Posture in standing: forward and rounded shoulders   Pelvic alignment: Not assessed today      BREATHING MECHANICS ASSESSMENT   Thorax Assessment During Quiet Respiration: Decreased excursion of abdominal wall  and Decreased excursion bilaterally of lateral " ribs     VAGINAL PELVIC FLOOR EXAM    EXTERNAL ASSESSMENT  Introitus: gaping  Skin condition: WNL  Scarring: perineal laceration scar noted posteriorly    Sensation: WNL   Pain: none  Voluntary contraction: visible lift  Voluntary relaxation: visible drop  Involuntary contraction: not tested   Bearing down: bulge  Perineal descent: present      INTERNAL ASSESSMENT  Pain: none; pain during intercourse is at the cystocele    Sensation: able to localized pressure appropriately   Vaginal vault: roomy   Muscle Bulk: atrophy   Muscle Power: -2/5    Quality of contraction: slow rise and decreased hold   Specificity: poor    Coordination: tends to hold breath during PFM contration   Bearing down: bulge  Prolapse check: Grade 2-3 cystocele and Grade 1 rectocele  Does Pelvic Floor drop and relax with a diaphragmatic breath? no      Intake Outcome Measures for FOTO Survey    Therapist reviewed FOTO scores for Raegan Osman on 11/15/2023.     FOTO report- see Media section in Epic or account episode details in FOTO.      Intake Score:   GIVE NEXT VISIT          TREATMENT        Treatment Time In: 11:24 AM   Treatment Time Out: 11:32 AM   Total Treatment time (time-based codes) separate from Evaluation: 8 minutes    Therapeutic Activity Patient participated in dynamic functional therapeutic activities to improve functional performance for 8 minutes. Including: Education as described below.   -bowel positioning/mechanics  -kegel with posterior pelvic tilt     [x] anatomy/physiology of pelvic floor, posture/body mechanices, kegels, proper bearing down techniques, and behavior modifications   [] Instruction on diaphragmatic breathing   [] Education on visual cues/mental imagery for pelvic floor relaxation  [] Instruction on the Knack for reduction of stress urinary incontinence  [x] Pelvic anatomy edu and impact on current level of function   [x] HEP building/HEP review      Written Home Exercises and Education Provided:  yes. Exercises were reviewed and Raegan was able to demonstrate them prior to the end of the session.  Raegan demonstrated good  understanding of the education provided. See EMR under Patient Instructions for exercises and education provided during therapy sessions.      ASSESSMENT     Raegan is a 23 y.o. female referred to outpatient Physical Therapy with a medical diagnosis of N81.6 (ICD-10-CM) - Rectocele . Pt presents with altered posture, poor knowledge of body mechanics and posture, pelvic floor tenderness, decreased pelvic muscle strength, decreased endurance of the pelvic muscles, decreased phasic ability of the pelvic muscles, poor quality of pelvic muscle contraction, and dysfunctional defecation. Patient presents with signs and symptoms consistent with pelvic floor dysfunction/weakness and pelvic organ prolapse.      Pt prognosis is Good.   Pt will benefit from skilled outpatient Physical Therapy to address the deficits stated above and in the chart below, provide pt/family education, and to maximize pt's level of independence.     Plan of care discussed with patient: Yes  Pt's spiritual, cultural and educational needs considered and patient is agreeable to the plan of care and goals as stated below:     Anticipated Barriers for therapy: none    Medical Necessity is demonstrated by the following  History  Co-morbidities and personal factors that may impact the plan of care  LOW: no personal factors / co-morbidities   MODERATE: 1-2 personal factors / co-morbidities   HIGH: 3+ personal factors / co-morbidities    Moderate / High Support Documentation:   Co-morbidities   consumption of bladder irritants, depression, and prior abdominal surgery    Personal Factors:   no deficits     Examination  Body Structures and Functions, activity limitations and participation restrictions that may impact the plan of care  LOW: addressing 1-2 elements   MODERATE: 3+ elements   HIGH: 4+ elements (please support  below)    Moderate / High Support Documentation:   Body Regions/Systems/Functions:   altered posture, poor knowledge of body mechanics and posture, pelvic floor tenderness, decreased pelvic muscle strength, decreased endurance of the pelvic muscles, decreased phasic ability of the pelvic muscles, poor quality of pelvic muscle contraction, and dysfunctional defecation    Activity Limitations:  intercourse/vaginal exam/tampon use without pain and Pain with ADLs    Participation Restrictions:  relationship with spouse/partner    Activity limitations:   Learning and applying knowledge  no deficits    General Tasks and Commands  no deficits    Communication  no deficits    Mobility  no deficits    Self care  no deficits    Domestic Life  no deficits    Interactions/Relationships  no deficits    Life Areas  no deficits    Community and Social Life  no deficits     Clinical Presentation  LOW: stable   MODERATE: Evolving   HIGH: Unstable     Decision Making/ Complexity Score: low            Goals:  Short Term Goals: 4 weeks   Pt to be able to perform a 5 second kegel x 10 reps to demonstrate improving strength and endurance needed for continence.  Pt to demonstrate proper diaphragmatic breathing to help with calming the nervous system in order to decrease pain and to improve abdominal wall musculature extensibility.   Pt to demonstrate good body mechanics when performing ADLs such as lifting and bending to decrease strain to lumbopelvic structures and reduce risk of further injury.  Pt to demonstrate proper positioning on commode with breathing techniques to decrease strain with BM to enable pt to feel empty after BM.   Pt to demonstrate independence with performing bowel massage to help with gut motility.     Long Term Goals: 12 weeks ,   Pt to be discharged with home plan for carry over after discharge.    Pt will be trained and compliant with postural strategies in sitting and standing to improve alignment and decrease  pain and muscle fatigue  Pt to report being able to have a BM without straining 90% of the time to demonstrate improving PF coordination.     PLAN     Plan of care Certification: 11/15/2023 to 2/15/2024.    Outpatient Physical Therapy 1-2 times weekly for 12 weeks to include the following interventions: therapeutic exercises, therapeutic activity, neuromuscular re-education, gait training, manual therapy, modalities PRN, patient/family education, and self care/home management,  and  dry needling.     Mami Roth, PT

## 2023-11-17 ENCOUNTER — OFFICE VISIT (OUTPATIENT)
Dept: OBSTETRICS AND GYNECOLOGY | Facility: CLINIC | Age: 23
End: 2023-11-17
Payer: MEDICAID

## 2023-11-17 VITALS
HEIGHT: 69 IN | BODY MASS INDEX: 22.92 KG/M2 | WEIGHT: 154.75 LBS | SYSTOLIC BLOOD PRESSURE: 113 MMHG | DIASTOLIC BLOOD PRESSURE: 56 MMHG

## 2023-11-17 DIAGNOSIS — Z72.89 OTHER PROBLEMS RELATED TO LIFESTYLE: ICD-10-CM

## 2023-11-17 DIAGNOSIS — Z11.3 ENCOUNTER FOR SCREENING FOR INFECTIONS WITH PREDOMINANTLY SEXUAL MODE OF TRANSMISSION: ICD-10-CM

## 2023-11-17 DIAGNOSIS — Z12.4 CERVICAL CANCER SCREENING: ICD-10-CM

## 2023-11-17 DIAGNOSIS — N76.0 ACUTE VAGINITIS: ICD-10-CM

## 2023-11-17 DIAGNOSIS — Z97.5 IUD (INTRAUTERINE DEVICE) IN PLACE: ICD-10-CM

## 2023-11-17 DIAGNOSIS — Z01.419 ENCOUNTER FOR GYNECOLOGICAL EXAMINATION WITHOUT ABNORMAL FINDING: Primary | ICD-10-CM

## 2023-11-17 DIAGNOSIS — B37.31 YEAST VAGINITIS: ICD-10-CM

## 2023-11-17 LAB
GARDNERELLA VAGINALIS: ABNORMAL
OTHER MICROSC. OBSERVATIONS: ABNORMAL
POC BACTERIAL VAGINOSIS: ABNORMAL
POC CLUE CELLS: NEGATIVE
TRICHOMONAS, POC: NEGATIVE
YEAST WET PREP: POSITIVE

## 2023-11-17 PROCEDURE — 3074F SYST BP LT 130 MM HG: CPT | Mod: CPTII,,, | Performed by: NURSE PRACTITIONER

## 2023-11-17 PROCEDURE — 99395 PREV VISIT EST AGE 18-39: CPT | Mod: S$PBB,,, | Performed by: NURSE PRACTITIONER

## 2023-11-17 PROCEDURE — 1160F PR REVIEW ALL MEDS BY PRESCRIBER/CLIN PHARMACIST DOCUMENTED: ICD-10-PCS | Mod: CPTII,,, | Performed by: NURSE PRACTITIONER

## 2023-11-17 PROCEDURE — 3008F PR BODY MASS INDEX (BMI) DOCUMENTED: ICD-10-PCS | Mod: CPTII,,, | Performed by: NURSE PRACTITIONER

## 2023-11-17 PROCEDURE — 99213 OFFICE O/P EST LOW 20 MIN: CPT | Mod: PBBFAC,PO | Performed by: NURSE PRACTITIONER

## 2023-11-17 PROCEDURE — 99999PBSHW POCT WET PREP: Mod: PBBFAC,,,

## 2023-11-17 PROCEDURE — 3078F DIAST BP <80 MM HG: CPT | Mod: CPTII,,, | Performed by: NURSE PRACTITIONER

## 2023-11-17 PROCEDURE — 87491 CHLMYD TRACH DNA AMP PROBE: CPT | Performed by: NURSE PRACTITIONER

## 2023-11-17 PROCEDURE — 1160F RVW MEDS BY RX/DR IN RCRD: CPT | Mod: CPTII,,, | Performed by: NURSE PRACTITIONER

## 2023-11-17 PROCEDURE — 87210 SMEAR WET MOUNT SALINE/INK: CPT | Mod: PBBFAC,PO | Performed by: NURSE PRACTITIONER

## 2023-11-17 PROCEDURE — 1159F MED LIST DOCD IN RCRD: CPT | Mod: CPTII,,, | Performed by: NURSE PRACTITIONER

## 2023-11-17 PROCEDURE — 3078F PR MOST RECENT DIASTOLIC BLOOD PRESSURE < 80 MM HG: ICD-10-PCS | Mod: CPTII,,, | Performed by: NURSE PRACTITIONER

## 2023-11-17 PROCEDURE — 99395 PR PREVENTIVE VISIT,EST,18-39: ICD-10-PCS | Mod: S$PBB,,, | Performed by: NURSE PRACTITIONER

## 2023-11-17 PROCEDURE — 99999PBSHW PR PBB SHADOW TECHNICAL ONLY FILED TO HB: Mod: PBBFAC,,,

## 2023-11-17 PROCEDURE — 99999PBSHW PR PBB SHADOW TECHNICAL ONLY FILED TO HB: ICD-10-PCS | Mod: PBBFAC,,,

## 2023-11-17 PROCEDURE — 99999 PR PBB SHADOW E&M-EST. PATIENT-LVL III: CPT | Mod: PBBFAC,,, | Performed by: NURSE PRACTITIONER

## 2023-11-17 PROCEDURE — 1159F PR MEDICATION LIST DOCUMENTED IN MEDICAL RECORD: ICD-10-PCS | Mod: CPTII,,, | Performed by: NURSE PRACTITIONER

## 2023-11-17 PROCEDURE — 88175 CYTOPATH C/V AUTO FLUID REDO: CPT | Performed by: NURSE PRACTITIONER

## 2023-11-17 PROCEDURE — 99999 PR PBB SHADOW E&M-EST. PATIENT-LVL III: ICD-10-PCS | Mod: PBBFAC,,, | Performed by: NURSE PRACTITIONER

## 2023-11-17 PROCEDURE — 3008F BODY MASS INDEX DOCD: CPT | Mod: CPTII,,, | Performed by: NURSE PRACTITIONER

## 2023-11-17 PROCEDURE — 3074F PR MOST RECENT SYSTOLIC BLOOD PRESSURE < 130 MM HG: ICD-10-PCS | Mod: CPTII,,, | Performed by: NURSE PRACTITIONER

## 2023-11-17 RX ORDER — FLUCONAZOLE 150 MG/1
150 TABLET ORAL
Qty: 2 TABLET | Refills: 0 | Status: SHIPPED | OUTPATIENT
Start: 2023-11-17 | End: 2023-11-21

## 2023-11-17 NOTE — PROGRESS NOTES
Subjective:       Patient ID: Raegan Osman is a 23 y.o. female.    Chief Complaint:  Well Woman      History of Present Illness  HPI  Annual Exam-Premenopausal   presents for annual exam. The patient has complaints today of intermittent vaginal itching near the introitus. The patient is sexually active with usual partner; no problems. GYN screening history: last pap: approximate date  or  and was normal in [place]. The patient wears seatbelts: yes. The patient participates in regular exercise: yes. Has been walking lately.  Has the patient ever been transfused or tattooed?: no. The patient reports that there is not domestic violence in her life.    Intermittent spotting with IUD; mild cramping the last couple days. Not checking for strings. Did see PFT this week.    No bladder or bowel issues.    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 (Menstrual status: Birth Control).   Date of Last Pap: 2023    OB History    Para Term  AB Living   3 2 2 0 1 2   SAB IAB Ectopic Multiple Live Births   1 0 0   2      # Outcome Date GA Lbr Colin/2nd Weight Sex Delivery Anes PTL Lv   3 Term 23 39w0d   M Vag-Spont   JUNAID   2 SAB 2022           1 Term 02/10/20 40w1d / 01:06 3.66 kg (8 lb 1.1 oz) M Vag-Spont EPI N JUNAID      Complications: Fever of unknown origin (FUO), Hemorrhage after delivery of fetus       Review of Systems  Review of Systems   Constitutional:  Negative for activity change, appetite change, chills, fatigue and fever.   HENT:  Negative for nasal congestion and mouth sores.    Respiratory:  Negative for cough, shortness of breath and wheezing.    Cardiovascular:  Negative for chest pain.   Gastrointestinal:  Negative for abdominal pain, constipation, diarrhea, nausea and vomiting.   Endocrine: Negative for hair loss and hot flashes.   Genitourinary:  Negative for bladder incontinence, decreased libido, dysmenorrhea, dyspareunia, dysuria,  frequency, genital sores, hematuria, hot flashes, menorrhagia, menstrual problem, pelvic pain, urgency, vaginal discharge, vaginal pain, urinary incontinence, postcoital bleeding, postmenopausal bleeding, vaginal dryness and vaginal odor.        +vulvar itching   Musculoskeletal:  Negative for back pain.   Integumentary:  Negative for breast mass, nipple discharge, breast skin changes and breast tenderness.   Neurological:  Negative for headaches.   Hematological:  Negative for adenopathy.   Psychiatric/Behavioral:  Negative for depression and sleep disturbance. The patient is not nervous/anxious.    All other systems reviewed and are negative.  Breast: Negative for breast self exam, lump, mass, mastodynia, nipple discharge, skin changes and tenderness          Objective:      Physical Exam:   Constitutional: She is oriented to person, place, and time. She appears well-developed and well-nourished. No distress.    HENT:   Head: Normocephalic and atraumatic.   Nose: Nose normal.    Eyes: Pupils are equal, round, and reactive to light. Conjunctivae and EOM are normal. Right eye exhibits no discharge. Left eye exhibits no discharge.     Cardiovascular:  Normal rate, regular rhythm and normal heart sounds.      Exam reveals no gallop, no friction rub, no clubbing, no cyanosis and no edema.       No murmur heard.   Pulmonary/Chest: Effort normal and breath sounds normal. No respiratory distress. She has no decreased breath sounds. She has no wheezes. She has no rhonchi. She has no rales.   Breast exam deferred per pt        Abdominal: Soft. Bowel sounds are normal. She exhibits no distension. There is no abdominal tenderness. There is no rebound and no guarding. Hernia confirmed negative in the right inguinal area and confirmed negative in the left inguinal area.     Genitourinary:    Inguinal canal, uterus, right adnexa and left adnexa normal.   Rectum:      No external hemorrhoid.            Pelvic exam was performed  with patient in the lithotomy position.   The external female genitalia was normal.   No external genitalia lesions identified,Genitalia hair distrobution normal .   Labial bartholins normal.There is no rash, tenderness, lesion or injury on the right labia. There is no rash, tenderness, lesion or injury on the left labia. Cervix is normal. Right adnexum displays no mass, no tenderness and no fullness. Left adnexum displays no mass, no tenderness and no fullness. There is bleeding (scant amt brown), rectocele and cystocele in the vagina. No erythema,  no vaginal discharge or tenderness in the vagina.    No foreign body in the vagina.      No signs of injury in the vagina.   Vagina was moist.Cervix exhibits no motion tenderness, no lesion, no discharge, no friability, no lesion, no tenderness and no polyp.    pap smear completedUerus contour normal  Uterus is not enlarged and not tender. Uterus size: 8 cm.Normal urethral meatus.Urethral Meatus exhibits: urethral lesion and prolapsedUrethra findings: no urethral mass, no tenderness and no urethral scarringBladder findings: no bladder distention and no bladder tendernessIUD strings visualized.          Musculoskeletal: Normal range of motion and moves all extremeties.      Lymphadenopathy: No inguinal adenopathy noted on the right or left side.    Neurological: She is alert and oriented to person, place, and time.    Skin: Skin is warm and dry. No rash noted. She is not diaphoretic. No cyanosis or erythema. No pallor. Nails show no clubbing.    Psychiatric: She has a normal mood and affect. Her speech is normal and behavior is normal. Judgment and thought content normal.             Assessment:        1. Encounter for gynecological examination without abnormal finding    2. Cervical cancer screening    3. Other problems related to lifestyle    4. Encounter for screening for infections with predominantly sexual mode of transmission    5. Yeast vaginitis    6. Acute  vaginitis    7. IUD (intrauterine device) in place               Plan:   Rx sent for diflucan with instructions  Vaginal hygiene practices discussed.    Continue strings checks after menses ends  NSAIDs as needed for pelvic pain; if not resolved RTC  May still experience irregular bleeding for 3-6 months after insertion    Reviewed updated recommendations for pap smears (every 3 years) in low risk patients.  Recommend annual pelvic exams.  Reviewed recommendations for annual CBE.  Next pap due in 2026.   RTC 1 year or sooner prn.  To PCP for other health maintenance.      Encounter for gynecological examination without abnormal finding  -     Liquid-Based Pap Smear, Screening  -     C. trachomatis/N. gonorrhoeae by AMP DNA    Cervical cancer screening  -     Liquid-Based Pap Smear, Screening    Other problems related to lifestyle  -     C. trachomatis/N. gonorrhoeae by AMP DNA    Encounter for screening for infections with predominantly sexual mode of transmission  -     C. trachomatis/N. gonorrhoeae by AMP DNA    Yeast vaginitis  -     fluconazole (DIFLUCAN) 150 MG Tab; Take 1 tablet (150 mg total) by mouth every 72 hours. for 2 doses  Dispense: 2 tablet; Refill: 0    Acute vaginitis  -     POCT Wet Prep    IUD (intrauterine device) in place

## 2023-11-18 LAB
C TRACH DNA SPEC QL NAA+PROBE: NOT DETECTED
N GONORRHOEA DNA SPEC QL NAA+PROBE: NOT DETECTED

## 2023-11-28 LAB
FINAL PATHOLOGIC DIAGNOSIS: NORMAL
Lab: NORMAL

## 2023-12-06 NOTE — PROGRESS NOTES
"[facility]   Physical Therapy Treatment Note      Name: Raegan Osman  Clinic Number: [id number]    Therapy Diagnosis:   Encounter Diagnoses   Name Primary?    Female genital prolapse, unspecified type Yes    Pelvic floor weakness in female      Physician: Hillary Yeboah NP    Visit Date: 12/8/2023    Physician Orders: PT Eval and Treat   Medical Diagnosis from Referral: N81.6 (ICD-10-CM) - Rectocele   Evaluation Date: 11/15/2023  Authorization Period Expiration: 6/21/2024  Plan of Care Expiration: 2/15/2024  Progress Note Due: 12/15/2023  Visit # / Visits authorized: 1/ 10   FOTO: 1/3     Precautions: Standard     Time In: 10:15 AM   Time Out: 10:57 AM   Total Billable Time: 42 minutes    Subjective     Pt reports: bowel positioning and kegel with posterior pelvic tilt helped.  She was compliant with home exercise program.  Response to previous treatment: decreased pelvic pressure   Functional change: ease with bowel movements     Pain: 0/10  Location: none       Objective      Objective Measures updated at progress report unless specified.       Treatment   Raegan received the treatments listed below:    No intravaginal treatment today.  Signed consent form already on file.     Therapeutic Exercise to develop  strength, endurance, ROM, flexibility, posture, and core stabilization for 17 minutes including:   Bridges with adduction ball 2 x 10   SL clams with red thera band 2 x 10    Steam boats x 10   Sit to stands with add ball x 10   HL hip add with ball 10 x 10 seconds   Anti rotations 2 x 10 7#   posterior pelvic tilts 10 x 10 seconds   Pelvic circles on physio ball x 20 cw/ccw     Neuromuscular Re-education to develop Coordination, Control, and Posture for 15 minutes including:   TA contraction 5" x 10               With march x 10   diaphragmatic breathing x 1 min   kegel Quick flicks with posterior pelvic tilt 2 x 10    With march x 10   Endurance holds 5" x 10 "   Tandem walking on foam beam x 5 laps   Side stepping on foam beam x 5 laps    Add next visit: tandem re bounder ball toss     Manual Therapy to develop flexibility, extensibility, and desensitization for 0 minutes including: none    Therapeutic Activity Patient participated in dynamic functional therapeutic activities to improve functional performance for 10 minutes. Including: Education as described below.  Pressure management   Plan of care/ progress with therapy     Home Exercises Provided and Patient Education Provided     [] Instruction on self-release to superficial PFM to reduce discomfort with initial penetration - pt able to return demonstrate independently after initial instruction  [] Discussion of intercourse considerations for pelvic pain - lubricant, positioning, pillows, pelvic floor massage  [] Instruction on urge suppression techniques  [x] Instruction on diaphragmatic breathing and hip-opening stretches for pelvic floor relaxation  [] Education on visual cues/mental imagery for pelvic floor relaxation  [] Education on techniques to reduce post-void dribble  [] Instruction on the Knack for reduction of stress urinary incontinence  [] Instruction on log-roll technique for transfers to reduce strain on back/abdominal wall, reduce incidence of incontinence  [x] Instruction on pressure management to protect pelvic organ prolapse/prevent incontinence  [x] Education on voiding optimization - seated on toilet, no pushing, pelvic floor squeeze upon completion  [x] Education on bowel movement optimization - positioning, toilet stool, breath coordination, pelvic floor relaxation, abdominal wall bracing  [x] HEP building/HEP review  [x] Discussed progression of plan of care with patient    Education provided:   anatomy/physiology of pelvic floor, posture/body mechanices, diaphragmatic breathing, isometric abdominal exercises, kegels, proper bearing down techniques, and behavior modifications  Discussed  progression of plan of care with patient; educated pt in activity modification; reviewed HEP with pt. Pt demonstrated and verbalized understanding of all instruction and was provided with a handout of HEP (see Patient Instructions).    Written Home Exercises Provided: yes.  Exercises were reviewed and Raegan was able to demonstrate them prior to the end of the session.  Raegan demonstrated good  understanding of the education provided.     See EMR under Patient Instructions for exercises provided  12/8/2023 .      Assessment     Patient presents with decreased pelvic pressure and improved bowel movements. Patient was started on pelvic floor muscle strengthening for optional length tension relationship of muscles and improved pelvic stability. Core and hip strengthening exercises were performed for increased postural stability to better support pelvic floor musculature. Patient tolerated all exercises well with no complaints. Patient was educated on proper pressure management for optimal pelvic health. Pt will continue to benefit from skilled outpatient physical therapy to address the deficits listed in the problem list box on initial evaluation, provide pt/family education and to maximize pt's level of independence in the home and community environment.       Raegan Is progressing well towards her goals.   Pt prognosis is Good.     Pt will continue to benefit from skilled outpatient physical therapy to address the deficits listed in the problem list box on initial evaluation, provide pt/family education and to maximize pt's level of independence in the home and community environment.     Pt's spiritual, cultural and educational needs considered and pt agreeable to plan of care and goals.     Anticipated barriers to physical therapy: none    Goals:   Short Term Goals: 4 weeks   Pt to be able to perform a 5 second kegel x 10 reps to demonstrate improving strength and endurance needed for continence.  Pt to  demonstrate proper diaphragmatic breathing to help with calming the nervous system in order to decrease pain and to improve abdominal wall musculature extensibility.   Pt to demonstrate good body mechanics when performing ADLs such as lifting and bending to decrease strain to lumbopelvic structures and reduce risk of further injury.  Pt to demonstrate proper positioning on commode with breathing techniques to decrease strain with BM to enable pt to feel empty after BM.   Pt to demonstrate independence with performing bowel massage to help with gut motility.      Long Term Goals: 12 weeks ,   Pt to be discharged with home plan for carry over after discharge.    Pt will be trained and compliant with postural strategies in sitting and standing to improve alignment and decrease pain and muscle fatigue  Pt to report being able to have a BM without straining 90% of the time to demonstrate improving PF coordination.     Plan     Plan of care Certification: 11/15/2023 to 2/15/2024.     Outpatient Physical Therapy 1-2 times weekly for 12 weeks to include the following interventions: therapeutic exercises, therapeutic activity, neuromuscular re-education, gait training, manual therapy, modalities PRN, patient/family education, and self care/home management,  and  dry needling.     Mami Roth, PT, DPT, PPCES

## 2023-12-08 ENCOUNTER — CLINICAL SUPPORT (OUTPATIENT)
Dept: REHABILITATION | Facility: HOSPITAL | Age: 23
End: 2023-12-08
Payer: MEDICAID

## 2023-12-08 DIAGNOSIS — N81.89 PELVIC FLOOR WEAKNESS IN FEMALE: ICD-10-CM

## 2023-12-08 DIAGNOSIS — N81.9 FEMALE GENITAL PROLAPSE, UNSPECIFIED TYPE: Primary | ICD-10-CM

## 2023-12-08 PROCEDURE — 97110 THERAPEUTIC EXERCISES: CPT | Mod: PN

## 2023-12-08 NOTE — PATIENT INSTRUCTIONS
Proper Pressure Management  Avoid Constipation - make high-fiber foods part of your regular diet (fruits, vegetables, bran, and beans), reduce the amount of processed foods in your diet, drink plenty of water and fluids every day, exercise daily, and manage your stress with meditation or other relaxation techniques  No Straining! Straining (bearing down and holding your breath) puts additional downward pressure on our organs, causing increased prolapse and pelvic pressure. Instead, blow out the candles as you gently push down. Do NOT hold your breath!  Use a Stool - Promote a squat position when voiding. Get your knees up higher than your hips. Squatting helps relax the pelvic floor muscles and reduces straining.  Avoid heavy lifting and high-impact activities until you can strengthen your core and pelvic floor muscles appropriately. When you do have to lift hold the load close to your body to reduce strain and do not hold your breath when lifting.  Do not Valsalva (hold your breath and push) at any time  Use diaphragmatic breathing (belly breathing) to manage stress, help empty bladder, help empty bowels, and help lightly stretch pelvic floor muscles

## 2023-12-12 NOTE — PROGRESS NOTES
"[facility]   Physical Therapy Treatment Note      Name: Raegan Osman  Clinic Number: [id number]    Therapy Diagnosis:   Encounter Diagnoses   Name Primary?    Female genital prolapse, unspecified type Yes    Pelvic floor weakness in female        Physician: Hillary Yeboah NP    Visit Date: 12/15/2023    Physician Orders: PT Eval and Treat   Medical Diagnosis from Referral: N81.6 (ICD-10-CM) - Rectocele   Evaluation Date: 11/15/2023  Authorization Period Expiration: 6/21/2024  Plan of Care Expiration: 2/15/2024  Progress Note Due: 12/15/2023  Visit # / Visits authorized: 1/ 10   FOTO: 1/3     Precautions: Standard     Time In: 10:15 AM   Time Out: 11:00 AM   Total Billable Time: 45 minutes    Subjective     Pt reports: increased bowel frequency after staring to drink coffee in the mornings.   She was compliant with home exercise program.  Response to previous treatment: decreased pelvic pressure   Functional change: ease with bowel movements     Pain: 0/10  Location: none       Objective      Objective Measures updated at progress report unless specified.       Treatment   Raegan received the treatments listed below:    No intravaginal treatment today.  Signed consent form already on file.     Therapeutic Exercise to develop  strength, endurance, ROM, flexibility, posture, and core stabilization for 20 minutes including:   Bridges with adduction ball 2 x 10   SL clams with red thera band 2 x 10    Steam boats x 10   HL hip add with ball 10 x 10 seconds   Anti rotations 2 x 10 7#   posterior pelvic tilts 10 x 10 seconds   Pelvic circles on physio ball x 20 cw/ccw     Deferred: Sit to stands with add ball 2 x 10     Neuromuscular Re-education to develop Coordination, Control, and Posture for 15 minutes including:   TA contraction 5" x 10               With march x 10   diaphragmatic breathing x 1 min   kegel Quick flicks with posterior pelvic tilt 2 x 10    With march x " "20  Tandem walking on foam beam x 5 laps   Side stepping on foam beam x 5 laps     Deferred: tandem re bounder ball toss    Endurance holds 5" x 10     Manual Therapy to develop flexibility, extensibility, and desensitization for 0 minutes including: none    Therapeutic Activity Patient participated in dynamic functional therapeutic activities to improve functional performance for 10 minutes. Including: Education as described below.  Pressure management   Plan of care/ progress with therapy   Updated HEP     Home Exercises Provided and Patient Education Provided     [] Instruction on self-release to superficial PFM to reduce discomfort with initial penetration - pt able to return demonstrate independently after initial instruction  [] Discussion of intercourse considerations for pelvic pain - lubricant, positioning, pillows, pelvic floor massage  [] Instruction on urge suppression techniques  [x] Instruction on diaphragmatic breathing and hip-opening stretches for pelvic floor relaxation  [] Education on visual cues/mental imagery for pelvic floor relaxation  [] Education on techniques to reduce post-void dribble  [] Instruction on the Knack for reduction of stress urinary incontinence  [] Instruction on log-roll technique for transfers to reduce strain on back/abdominal wall, reduce incidence of incontinence  [x] Instruction on pressure management to protect pelvic organ prolapse/prevent incontinence  [x] Education on voiding optimization - seated on toilet, no pushing, pelvic floor squeeze upon completion  [x] Education on bowel movement optimization - positioning, toilet stool, breath coordination, pelvic floor relaxation, abdominal wall bracing  [x] HEP building/HEP review  [x] Discussed progression of plan of care with patient    Education provided:   anatomy/physiology of pelvic floor, posture/body mechanices, diaphragmatic breathing, isometric abdominal exercises, kegels, proper bearing down techniques, and " behavior modifications  Discussed progression of plan of care with patient; educated pt in activity modification; reviewed HEP with pt. Pt demonstrated and verbalized understanding of all instruction and was provided with a handout of HEP (see Patient Instructions).    Written Home Exercises Provided: yes.  Exercises were reviewed and Raegan was able to demonstrate them prior to the end of the session.  Raegan demonstrated good  understanding of the education provided.     See EMR under Patient Instructions for exercises provided  12/8/2023 .      Assessment     Patient presents with increased bowel frequency with drinking coffee. Patient tolerated all exercises well with no complaints. Updated HEP was given for farther progress at home. Patient is progressing well with therapy and has met 5 out of 8 goals. Pt will continue to benefit from skilled outpatient physical therapy to address the deficits listed in the problem list box on initial evaluation, provide pt/family education and to maximize pt's level of independence in the home and community environment.       Raegan Is progressing well towards her goals.   Pt prognosis is Good.     Pt will continue to benefit from skilled outpatient physical therapy to address the deficits listed in the problem list box on initial evaluation, provide pt/family education and to maximize pt's level of independence in the home and community environment.     Pt's spiritual, cultural and educational needs considered and pt agreeable to plan of care and goals.     Anticipated barriers to physical therapy: none    Goals:   Short Term Goals: 4 weeks   Pt to be able to perform a 5 second kegel x 10 reps to demonstrate improving strength and endurance needed for continence. MET 12/15/2023  Pt to demonstrate proper diaphragmatic breathing to help with calming the nervous system in order to decrease pain and to improve abdominal wall musculature extensibility. MET 12/15/2023  Pt to  demonstrate good body mechanics when performing ADLs such as lifting and bending to decrease strain to lumbopelvic structures and reduce risk of further injury. MET 12/15/2023  Pt to demonstrate proper positioning on commode with breathing techniques to decrease strain with BM to enable pt to feel empty after BM. MET 12/15/2023  Pt to demonstrate independence with performing bowel massage to help with gut motility.      Long Term Goals: 12 weeks ,   Pt to be discharged with home plan for carry over after discharge.    Pt will be trained and compliant with postural strategies in sitting and standing to improve alignment and decrease pain and muscle fatigue  Pt to report being able to have a BM without straining 90% of the time to demonstrate improving PF coordination. MET 12/15/2023    Plan     Plan of care Certification: 11/15/2023 to 2/15/2024.     Outpatient Physical Therapy 1-2 times weekly for 12 weeks to include the following interventions: therapeutic exercises, therapeutic activity, neuromuscular re-education, gait training, manual therapy, modalities PRN, patient/family education, and self care/home management,  and  dry needling.     Mami Roth, PT, DPT, PPCES

## 2023-12-15 ENCOUNTER — CLINICAL SUPPORT (OUTPATIENT)
Dept: REHABILITATION | Facility: HOSPITAL | Age: 23
End: 2023-12-15
Payer: MEDICAID

## 2023-12-15 DIAGNOSIS — N81.9 FEMALE GENITAL PROLAPSE, UNSPECIFIED TYPE: Primary | ICD-10-CM

## 2023-12-15 DIAGNOSIS — N81.89 PELVIC FLOOR WEAKNESS IN FEMALE: ICD-10-CM

## 2023-12-15 PROCEDURE — 97110 THERAPEUTIC EXERCISES: CPT | Mod: PN

## 2023-12-19 NOTE — PROGRESS NOTES
"[facility]   Physical Therapy Treatment Note      Name: Raegan Osman  Clinic Number: [id number]    Therapy Diagnosis:   Encounter Diagnoses   Name Primary?    Female genital prolapse, unspecified type Yes    Pelvic floor weakness in female          Physician: Hillary Yeboah NP    Visit Date: 12/22/2023    Physician Orders: PT Eval and Treat   Medical Diagnosis from Referral: N81.6 (ICD-10-CM) - Rectocele   Evaluation Date: 11/15/2023  Authorization Period Expiration: 6/21/2024  Plan of Care Expiration: 2/15/2024  Progress Note Due: 12/15/2023  Visit # / Visits authorized: 1/ 10   FOTO: 1/3     Precautions: Standard     Time In: 10:15 AM   Time Out: 11:00 AM   Total Billable Time: 45 minutes    Subjective     Pt reports: no new complaints.   She was compliant with home exercise program.  Response to previous treatment: decreased pelvic pressure   Functional change: ease with bowel movements     Pain: 0/10  Location: none       Objective      Objective Measures updated at progress report unless specified.       Treatment   Raegan received the treatments listed below:    No intravaginal treatment today.  Signed consent form already on file.     Therapeutic Exercise to develop  strength, endurance, ROM, flexibility, posture, and core stabilization for 22 minutes including:   Bridges with adduction ball 2 x 10   SL clams with red thera band 2 x 10    Steam boats x 10   HL hip add with ball 10 x 10 seconds   Anti rotations 2 x 10 7#   posterior pelvic tilts 10 x 10 seconds   Pelvic circles on physio ball x 20 cw/ccw   Sit to stands with add ball 2 x 10     Neuromuscular Re-education to develop Coordination, Control, and Posture for 15 minutes including:   TA contraction 5" x 10               With march x 10   diaphragmatic breathing   kegel Quick flicks with posterior pelvic tilt 2 x 10    With march x 20   Tandem walking on foam beam x 5 laps   Side stepping on foam beam x 5 " "laps     Deferred: tandem re bounder ball toss    Endurance holds 5" x 10     Manual Therapy to develop flexibility, extensibility, and desensitization for 0 minutes including: none    Therapeutic Activity Patient participated in dynamic functional therapeutic activities to improve functional performance for 8 minutes. Including: Education as described below.  Plan of care/ progress with therapy- possible discharge next visit      Home Exercises Provided and Patient Education Provided     [] Instruction on self-release to superficial PFM to reduce discomfort with initial penetration - pt able to return demonstrate independently after initial instruction  [] Discussion of intercourse considerations for pelvic pain - lubricant, positioning, pillows, pelvic floor massage  [] Instruction on urge suppression techniques  [x] Instruction on diaphragmatic breathing and hip-opening stretches for pelvic floor relaxation  [] Education on visual cues/mental imagery for pelvic floor relaxation  [] Education on techniques to reduce post-void dribble  [] Instruction on the Knack for reduction of stress urinary incontinence  [] Instruction on log-roll technique for transfers to reduce strain on back/abdominal wall, reduce incidence of incontinence  [x] Instruction on pressure management to protect pelvic organ prolapse/prevent incontinence  [x] Education on voiding optimization - seated on toilet, no pushing, pelvic floor squeeze upon completion  [x] Education on bowel movement optimization - positioning, toilet stool, breath coordination, pelvic floor relaxation, abdominal wall bracing  [x] HEP building/HEP review  [x] Discussed progression of plan of care with patient    Education provided:   anatomy/physiology of pelvic floor, posture/body mechanices, diaphragmatic breathing, isometric abdominal exercises, kegels, proper bearing down techniques, and behavior modifications  Discussed progression of plan of care with patient; " educated pt in activity modification; reviewed HEP with pt. Pt demonstrated and verbalized understanding of all instruction and was provided with a handout of HEP (see Patient Instructions).    Written Home Exercises Provided: yes.  Exercises were reviewed and Raegan was able to demonstrate them prior to the end of the session.  Raegan demonstrated good  understanding of the education provided.     See EMR under Patient Instructions for exercises provided  12/8/2023 .      Assessment     Patient presents with no new complaints. Patient tolerated all exercises well with no complaints. Plan to discharge next visit if patient continued to have no pelvic pressure or pelvic dysfunction symptoms. Pt will continue to benefit from skilled outpatient physical therapy to address the deficits listed in the problem list box on initial evaluation, provide pt/family education and to maximize pt's level of independence in the home and community environment.       Raegan Is progressing well towards her goals.   Pt prognosis is Good.     Pt will continue to benefit from skilled outpatient physical therapy to address the deficits listed in the problem list box on initial evaluation, provide pt/family education and to maximize pt's level of independence in the home and community environment.     Pt's spiritual, cultural and educational needs considered and pt agreeable to plan of care and goals.     Anticipated barriers to physical therapy: none    Goals:   Short Term Goals: 4 weeks   Pt to be able to perform a 5 second kegel x 10 reps to demonstrate improving strength and endurance needed for continence. MET 12/15/2023  Pt to demonstrate proper diaphragmatic breathing to help with calming the nervous system in order to decrease pain and to improve abdominal wall musculature extensibility. MET 12/15/2023  Pt to demonstrate good body mechanics when performing ADLs such as lifting and bending to decrease strain to lumbopelvic  structures and reduce risk of further injury. MET 12/15/2023  Pt to demonstrate proper positioning on commode with breathing techniques to decrease strain with BM to enable pt to feel empty after BM. MET 12/15/2023  Pt to demonstrate independence with performing bowel massage to help with gut motility.      Long Term Goals: 12 weeks ,   Pt to be discharged with home plan for carry over after discharge.    Pt will be trained and compliant with postural strategies in sitting and standing to improve alignment and decrease pain and muscle fatigue  Pt to report being able to have a BM without straining 90% of the time to demonstrate improving PF coordination. MET 12/15/2023    Plan     Plan of care Certification: 11/15/2023 to 2/15/2024.     Outpatient Physical Therapy 1-2 times weekly for 12 weeks to include the following interventions: therapeutic exercises, therapeutic activity, neuromuscular re-education, gait training, manual therapy, modalities PRN, patient/family education, and self care/home management,  and  dry needling.     Mami Roth, PT, DPT, PPCES

## 2023-12-22 ENCOUNTER — CLINICAL SUPPORT (OUTPATIENT)
Dept: REHABILITATION | Facility: HOSPITAL | Age: 23
End: 2023-12-22
Payer: MEDICAID

## 2023-12-22 DIAGNOSIS — N81.89 PELVIC FLOOR WEAKNESS IN FEMALE: ICD-10-CM

## 2023-12-22 DIAGNOSIS — N81.9 FEMALE GENITAL PROLAPSE, UNSPECIFIED TYPE: Primary | ICD-10-CM

## 2023-12-22 PROCEDURE — 97110 THERAPEUTIC EXERCISES: CPT | Mod: PN
